# Patient Record
Sex: FEMALE | Race: WHITE
[De-identification: names, ages, dates, MRNs, and addresses within clinical notes are randomized per-mention and may not be internally consistent; named-entity substitution may affect disease eponyms.]

---

## 2018-02-09 ENCOUNTER — HOSPITAL ENCOUNTER (INPATIENT)
Dept: HOSPITAL 80 - FED | Age: 23
LOS: 4 days | Discharge: HOME | DRG: 885 | End: 2018-02-13
Attending: PSYCHIATRY & NEUROLOGY | Admitting: PSYCHIATRY & NEUROLOGY
Payer: COMMERCIAL

## 2018-02-09 DIAGNOSIS — F33.2: Primary | ICD-10-CM

## 2018-02-09 DIAGNOSIS — Z72.0: ICD-10-CM

## 2018-02-09 DIAGNOSIS — G43.909: ICD-10-CM

## 2018-02-09 DIAGNOSIS — F60.3: ICD-10-CM

## 2018-02-09 LAB — PLATELET # BLD: 282 10^3/UL (ref 150–400)

## 2018-02-09 PROCEDURE — G0480 DRUG TEST DEF 1-7 CLASSES: HCPCS

## 2018-02-09 RX ADMIN — PRAZOSIN HYDROCHLORIDE SCH MG: 1 CAPSULE ORAL at 20:34

## 2018-02-09 NOTE — EDPHY
H & P


Stated Complaint: SI





- Personal History


LMP (Females 10-55): 15-21 Days Ago


Current Tetanus Diphtheria and Acellular Pertussis (TDAP): Yes





- Medical/Surgical History


Hx Asthma: No


Hx Chronic Respiratory Disease: No


Hx Diabetes: No


Hx Cardiac Disease: No


Hx Renal Disease: No


Hx Cirrhosis: No


Hx Alcoholism: No


Hx HIV/AIDS: No


Hx Splenectomy or Spleen Trauma: No


Other PMH: BIPOLAR, UMBILICAL HERNIA, TONSIL





- Social History


Smoking Status: Current some day smoker


Time Seen by Provider: 02/09/18 02:10


HPI/ROS: 





Chief Complaint:  Suicidal ideation





HPI:  22-year-old female with a history of bipolar disorder and depression is 

presenting with worsening prepped depression last 3 weeks and suicidal ideation 

for the last 3 days.  She thinks he has increasing stressors from being back at 

school.  She has a plan to hang herself.  Has a history of suicidal ideation in 

the past but has never made a suicide attempt but she was hospitalized and put 

on medical leave for depression from a year ago.  She is compliant with her 

medications.  She denies any alcohol or other drug use this morning.  No other 

ingestions.





ROS:  10 point Review of Systems is negative except as noted in the HPI.





PMH:  Bipolar disorder





Social History: No smoking, no alcohol,  no recreational drug use





Family History: non-contributory





Physical Exam:


Gen: Awake, Alert, No Distress


HEENT:  


     Nose: no rhinorrhea


     Eyes: PERRLA, EOMI


     Mouth: Moist mucosa 


Neck: Supple, no JVD


Chest: nontender, lungs clear to auscultation


Heart: S1, S2 normal, no murmur


Abd: Soft, non-tender, no guarding


Back: no CVA tenderness, no midline tenderness 


Ext: no edema, non-tender


Skin: no rash


Neuro: CN II-XII intact, Sensation grossly intact, Strength 5/5 in bilateral 

upper and lower extremities


 (Davian Palencia)


Constitutional: 


 Initial Vital Signs











Temperature (C)  36.4 C   02/09/18 01:57


 


Heart Rate  77   02/09/18 01:57


 


Respiratory Rate  16   02/09/18 01:57


 


Blood Pressure  128/65 H  02/09/18 01:57


 


O2 Sat (%)  95   02/09/18 01:57








 











O2 Delivery Mode               Room Air














Allergies/Adverse Reactions: 


 





lamotrigine [From Lamictal] Allergy (Verified 02/09/18 08:24)


 Rash








Home Medications: 














 Medication  Instructions  Recorded


 


ARIPiprazole [Abilify 10 mg (*)] 10 mg PO DAILY 02/09/18


 


FLUoxetine [Prozac 20 MG (*)] 20 mg PO DAILY 02/09/18


 


Prazosin HCl 2 mg PO BID 02/09/18














Medical Decision Making


ED Course/Re-evaluation: 





0720: Patient has been accepted to [facility] by Dr. Powers. 





This patient was turned over to me at shift change.  I filled out the 

appropriate transfer paperwork. (Levon Myers)





0700  patient signed out to Dr. Myers pending mental health evaluation (Davian Palencia)





- Data Points


Laboratory Results: 


 Laboratory Results





 02/09/18 02:39 





 02/09/18 02:39 








Medications Given: 


 





Aripiprazole (Abilify)  10 mg PO DAILY [facility]


   Stop: 08/08/18 13:44


   Last Admin: 02/09/18 14:28 Dose:  10 mg


Hydroxyzine HCl (Hydroxyzine Hcl)  25 mg PO Q6HRS PRN


   PRN Reason: Anxiety or insomnia


   Stop: 08/08/18 10:27


   Last Admin: 02/09/18 14:28 Dose:  25 mg


Prazosin HCl (Minipress)  1 mg PO BID JAILYN


   Stop: 08/08/18 20:59


   Last Admin: 02/09/18 20:34 Dose:  1 mg





Discontinued Medications





Acetaminophen (Tylenol)  650 mg PO EDNOW ONE


   Stop: 02/09/18 07:10


   Last Admin: 02/09/18 08:22 Dose:  650 mg








Departure





- Departure


Disposition: [facility] IP


Clinical Impression: 


 Suicidal ideation





Depression


Qualifiers:


 Depression Type: other depression Qualified Code(s): F32.89 - Other specified 

depressive episodes





Condition: Fair
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
## 2018-02-09 NOTE — BCON
[f 
rep st]



                                                  BEHAVIORAL HEALTH CONSULTATION





INTERNAL MEDICINE CONSULTATION



DATE OF CONSULTATION:  02/09/2018



REFERRING PHYSICIAN:  Harvey Powers MD





REASON FOR CONSULTATION:  Medical clearance for inpatient behavioral health 
stay.



HISTORY OF PRESENT ILLNESS:  This patient was brought in to the emergency 
department with her boyfriend after experiencing suicidal ideation.  She has a 
history of bipolar disorder and was on her medication.  She was evaluated by 
the mental health team and admitted for further psychiatric care. 



She currently is without any acute complaints.



PAST MEDICAL HISTORY:  

1.  Bipolar disorder.

2.  Migraine headaches.



PAST SURGICAL HISTORY:  She has not had any surgeries.



MEDICATIONS:  

1.  Aripiprazole 10 mg daily.

2.  Fluoxetine 20 mg daily.

3.  Prazosin 2 mg b.i.d.



ALLERGIES:  Lamotrigine.



SOCIAL HISTORY:  She lives with her boyfriend.  She is a nonsmoker and uses 
occasional alcohol.  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



FAMILY HISTORY:  Noncontributory.



REVIEW OF SYSTEMS:  She denies headache at present.  She reports intermittent 
leg pain and ankle pain, which she notices at night.  Sometimes the pain occurs 
together with swelling, but not always.  It can interfere with attaining sleep, 
but does not awaken her from sleep.  She denies any joint stiffness.  She 
denies fever, chills, weight change, cough, dyspnea, nausea, vomiting, 
constipation, diarrhea, or dysuria.  Otherwise, a 10-point review of systems is 
negative.



PHYSICAL EXAM:  VITAL SIGNS:  Blood pressure is 115/72, heart rate 92, 
respiratory rate 20, oxygen saturation 94% on room air, and temperature is 36.9 
degrees centigrade.  Her weight is 59 kg for a body mass index of 25.  GENERAL:
  This is an overweight appearing woman who appears her chronologic age.  She 
is cooperative and in no acute distress.  HEENT:  Extraocular movements are 
intact.  Pupils are dilated, round, and reactive.  Mucous membranes are moist.  
Dentition is in good condition.  NECK:  Supple.  HEART:  Regular rate and 
rhythm with no murmurs, rubs, or gallops.  LUNGS:  Clear to auscultation 
bilaterally.  ABDOMEN:  Benign.  EXTREMITIES:  There is no cyanosis, clubbing, 
or edema.  She is nontender in the lower legs.  She has normal range of motion 
at the ankles.  There is no joint effusion.  NEUROLOGIC:  She is alert and 
oriented x3.  Cranial nerves 2-12 are grossly intact.  There is no focal 
weakness.  Sensation is intact to light touch and gait is within normal limits.



LABORATORY DATA:  Labs from emergency department reveal a CBC that was overall 
within normal limits.  She had a slight elevation of absolute monocytes of no 
clinical significance.  Serum chemistry revealed a slightly low carbon dioxide 
at 21.  BUN was low at 5.  Otherwise, renal function and electrolytes were 
within normal limits.  Beta hCG was negative for pregnancy.  Toxicology screens 
in the urine and serum were negative for ethyl alcohol and substances of abuse.



ASSESSMENT AND RECOMMENDATIONS:  

1.  Bipolar disorder with suicidal ideation, pending further evaluation and 
management per Psychiatry and the mental health team.

2.  Migraines with last headache approximately 2 months ago.  They do not 
appear to be frequent enough to merit prophylactic medication.  She has 
acetaminophen ordered, which should be adequate.

3.  Intermittent leg pain of unclear etiology.  This does not seem to be 
consistent with any rheumatologic disease.  She was advised to follow up with 
primary care after discharge.

4.  I see no medical contraindications to this patient's continued stay on the 
inpatient behavioral health unit or to any psychiatric medications or 
procedures. 



Thank you very much for including me in the care of this patient and please do 
not hesitate to contact me or the hospitalist service should there be need for 
further medical evaluation.





Job #:  [id number]

MTDD

## 2018-02-10 VITALS — OXYGEN SATURATION: 96 % | RESPIRATION RATE: 14 BRPM

## 2018-02-10 RX ADMIN — PRAZOSIN HYDROCHLORIDE SCH MG: 1 CAPSULE ORAL at 20:18

## 2018-02-10 RX ADMIN — PRAZOSIN HYDROCHLORIDE SCH MG: 1 CAPSULE ORAL at 08:55

## 2018-02-11 VITALS — TEMPERATURE: 98 F

## 2018-02-11 RX ADMIN — PRAZOSIN HYDROCHLORIDE SCH MG: 1 CAPSULE ORAL at 10:00

## 2018-02-11 RX ADMIN — PRAZOSIN HYDROCHLORIDE SCH MG: 1 CAPSULE ORAL at 20:35

## 2018-02-11 NOTE — SOAPPROG
SOAP Progress Note


Assessment/Plan: 


Assessment:





21 yo female, part-time [employer] student, lives with parents in [place]. She has h/

o bipolar do, borderline personality traits. She was admitted for threatening 

to hang herself. She tied rope around her neck, but took it off and told her BF 

what she'd done. 




















Plan:





02/10/18 15:36


1. [facility] - Dr. Eckert made medication adjustments yesterday. Patient says 

hydroxyzine is "helping" her anxiety. She denies any SI/HI. Feels "less 

depressed." 


2. Recommend DBT or similar individual and group therapy to improve coping 

skills and help patient deal more effectively with stressors. Patient admits 

she has labile mood. This is more likely d/t poor affect regulation consistent 

with personality disorder rather than true bipolar laurita. 





02/11/18 15:03


1. CCM - patient doing "a lot better" today, denies any depressed mood, no 

crying, no sadness, denies any SI/HI


2. Likely to d/c tomorrow when hold expires. 


3. Needs f/u with therapist, referral to IOP recommended if patient willing to 

go and f/u with prescriber. 


Subjective: 


Met with patient, reviewed chart and d/w staff. Patient present with bright, 

cheerful affect, smiling and laughing with peers in group. She tells MD that 

she has been feeling "much better" since yesterday afternoon. No further 

episodes of mood lability, sadness, crying or depression. She denies feeling 

helpless, hopeless and denies any SI/HI. 





Objective: 





 Vital Signs











Temp Pulse Resp BP Pulse Ox


 


 36.6 C   85   14   103/56 L  96 


 


 02/11/18 06:00  02/11/18 06:00  02/11/18 06:00  02/11/18 06:00  02/11/18 06:00








MSE: Affect: Euthymic  Mood: "Good"  TP: Linear  TC: Denies any SI/HI, no 

evidence of psychosis  Insight/Judgment: Fair





- Time Spent With Patient


Time Spent With Patient: 


20"








- Pending Discharge


Pending Discharge Within 24 Hours: No


Pending Discharge Within 48 Hours: No





ICD10 Worksheet


Patient Problems: 


 Problems











Problem Status Onset


 


Depression Acute  


 


Suicidal ideation Acute

## 2018-02-12 RX ADMIN — PRAZOSIN HYDROCHLORIDE SCH MG: 1 CAPSULE ORAL at 08:50

## 2018-02-12 NOTE — SOAPPROG
SOAP Progress Note


Assessment/Plan: 


Assessment:





23 yo female, part-time [employer] student, lives with parents in [place]. She has h/

o bipolar do, borderline personality traits. She was admitted for threatening 

to hang herself. She tied rope around her neck, but took it off and told her BF 

what she'd done. 




















Plan:





02/10/18 15:36


1. [facility] - Dr. Eckert made medication adjustments yesterday. Patient says 

hydroxyzine is "helping" her anxiety. She denies any SI/HI. Feels "less 

depressed." 


2. Recommend DBT or similar individual and group therapy to improve coping 

skills and help patient deal more effectively with stressors. Patient admits 

she has labile mood. This is more likely d/t poor affect regulation consistent 

with personality disorder rather than true bipolar laurita. 





02/11/18 15:03


1. CCM - patient doing "a lot better" today, denies any depressed mood, no 

crying, no sadness, denies any SI/HI


2. Likely to d/c tomorrow when hold expires. 


3. Needs f/u with therapist, referral to IOP recommended if patient willing to 

go and f/u with prescriber. 





02/12/18 15:08


1. Patient says meds are "helping a lot" with her anxiety and mood. She denies 

feeling depressed and denies any SI/HI. 


2. Will change Prazosin to HS dosing only since patient is taking for 

nightmares. She had low DBP (< 60) the last 2 days. 


3. Patient has f/u with OP therapist, Cecilia Campbell, on 2/13/18 at 1700.


4. Will d/c tomorrow 


Subjective: 


Met with patient, reviewed chart and d/w staff. Patient present bright affect, 

cheerful, smiling. She says she is "pleased I got my hope back." She states 

that she feels "more stable" and "calm." She says the gabapentin and 

hydroxyzine make her feel "a lot less restless." She rates her depression as 1/

10 and denies any thoughts, plan or intent to hurt herself. 





Objective: 





 Vital Signs











Temp Pulse Resp BP Pulse Ox


 


 36.6 C   85   14   103/56 L  96 


 


 02/11/18 06:00  02/11/18 06:00  02/11/18 06:00  02/11/18 06:00  02/11/18 06:00








MSE: Affect: Euthymic  Mood: "A lot better" TP: Linear  TC: Denies any SI/HI  

Insight/Judgment: Fair





- Time Spent With Patient


Time Spent With Patient: 


20"








- Pending Discharge


Pending Discharge Within 24 Hours: No


Pending Discharge Within 48 Hours: No





ICD10 Worksheet


Patient Problems: 


 Problems











Problem Status Onset


 


Depression Acute  


 


Suicidal ideation Acute

## 2018-02-13 VITALS — HEART RATE: 82 BPM | SYSTOLIC BLOOD PRESSURE: 98 MMHG | DIASTOLIC BLOOD PRESSURE: 55 MMHG

## 2018-02-13 NOTE — BDS
[f rep st]



                                                  BEHAVIORAL HEALTH DISCHARGE SUMMARY





REASONS FOR ADMISSION:  The patient is 22-year-old female with a history of bipolar disorder and depr
ession, who was presenting with worsening depression x 3 weeks and suicidal ideation for the last 3 d
ays.  She thinks she has increasing stressors due to being back in school.  She had a plan to hang he
rself, made a noose but then set it aside and called her boyfriend instead, told him what she was chelsie
nning to do.  She has a history of SI but has never made a suicide attempt in the past.



ADMITTING DIAGNOSES:  

1.  Bipolar disorder, most recent episode mixed.  

2.  Borderline personality traits.  

3.  Psychosocial stressors include academic stress, lack of social support, conflict with boyfriend.



PHYSICAL EXAMINATION:  Please see Dr. Nikolas Kan's H and P for details.



ADMISSION LABS:  White cell count was 8.3, hemoglobin 14.3, hematocrit 41.5, platelet count 282.  Sod
ium was 139, potassium was 3.8, chloride was 105, hemoglobin A1c was 5.2, AST was 26, ALT was 33.  Tr
iglycerides 34, cholesterol 148, LDL 74, HDL 68.  TSH 4.620.  Beta HCG was negative.  Her urine drug 
screen was negative for all substances.



HOSPITAL COURSE:  This MD saw the patient over the weekend after she was admitted, noted that Dr. Libby almonte had made some medication adjustments including adding gabapentin at h.s. for anxiety as well as 
p.r.n. hydroxyzine.  The patient stated that these medications were "very helpful" and that she was r
eporting much less anxiety and she was sleeping much better.  She also felt much less depressed.  She
 was denying any thoughts, plans or intents to hurt herself.  She said that she had briefly felt scar
ed and anxious when she was first admitted to the hospital, but said that those feelings had gone archie
y very quickly.  



When MD met with the patient, her presentation was very bright and cheerful, she was laughing and smi
ling, very polite and pleasant.  She was interacting appropriately with her peers, attending groups a
nd participating in milieu activities.  She said that, "when I am distracted and have something to do
, I feel much better."  She says normally, her moods change "very frequently" but during her hospital
 course, patient said that she felt much more "stable" and said that her moods were much more "consis
tent".  She says she is usually "very sensitive" to other people's reactions to her, and she says patel
t when other people do not treat her well or she feels bad about something that she has done, that irma arredondo gets very depressed and sad.  She admitted that she did have a whole lot of coping techniques or sk
ills to use when she was dealing with stress or interpersonal conflict, and MD suggested that she maik
ht want to participate in a DBT IOP or similar type of group therapy to work on developing interperso
nal skills.  The patient thought that was a good idea. 



On day of discharge, the patient says she was "very happy" to be going home.  Her boyfriend came to p
ick her up.  She said that things are "much better" for her.  She denied feeling depressed.  She ashtyn
ed any anxiety.  She said that she has been sleeping "a whole lot better" since she has been here.  N
o episodes of panic.  She denied feeling hopeless, helpless.  Denies anhedonia.  She was sleeping wel
l and eating well.  During her hospital stay, she denied any thoughts, plans or intents to hurt herse
lf or hurt anyone else.  She is looking forward to participating in group therapy.  She is going to g
o back to seeing her outpatient therapist, as well as her outpatient prescriber.  She felt like the m
edications were "helping", and that she would like to stay on them.



DISCHARGE MEDICATIONS:  The patient was given a prescription for Abilify 10 mg p.o. daily, 30 tabs, n
o refills.  Neurontin 200 mg p.o. at bedtime p.r.n., 30 caps, no refills.  Hydroxyzine 25 mg daily p.
r.n. 30 tabs, no refills.  Prazosin 1 mg p.o. at bedtime, 30 tabs, no refills.  The patient was put o
n prazosin for nightmares and she said it was very helpful for her.



DISCHARGE DIAGNOSES:  

1.  Major depressive disorder, recurrent, severe, without psychotic features.

2.  Borderline personality disorder.

3.  Psychosocial stressors include lack of social support, conflict with boyfriend and academic stres
sors.  Lack of personal coping skills.



CONDITION AT DISCHARGE:  Stable.  Patient reports that her mood is "really good."  Patient says that 
she is feeling "happy" today.  Denies any thoughts, plans or intents to hurt herself or anyone else. 
 She denies feeling depressed.



ATTITUDE:  Patient is "very excited" about going back home, getting back to school and pursuing outpa
tient treatment.



FOLLOWUP APPOINTMENTS:  The patient is scheduled to see Cecilia Rivera on 2/13/2018 at 5 p.m., 
and then she has a followup with Theron Ruiz MD, her prescriber.  She has an intake referral for the 
outpatient clinic at [facility] for possible IOP admission.





Job #:  [id number]

## 2021-09-15 ENCOUNTER — APPOINTMENT (OUTPATIENT)
Age: 26
Setting detail: DERMATOLOGY
End: 2021-09-15

## 2021-09-15 DIAGNOSIS — L81.4 OTHER MELANIN HYPERPIGMENTATION: ICD-10-CM

## 2021-09-15 DIAGNOSIS — L81.9 DISORDER OF PIGMENTATION, UNSPECIFIED: ICD-10-CM

## 2021-09-15 DIAGNOSIS — D22 MELANOCYTIC NEVI: ICD-10-CM

## 2021-09-15 DIAGNOSIS — L21.8 OTHER SEBORRHEIC DERMATITIS: ICD-10-CM

## 2021-09-15 DIAGNOSIS — L81.3 CAFÉ AU LAIT SPOTS: ICD-10-CM

## 2021-09-15 DIAGNOSIS — Z12.83 ENCOUNTER FOR SCREENING FOR MALIGNANT NEOPLASM OF SKIN: ICD-10-CM

## 2021-09-15 PROBLEM — D22.5 MELANOCYTIC NEVI OF TRUNK: Status: ACTIVE | Noted: 2021-09-15

## 2021-09-15 PROBLEM — D22.39 MELANOCYTIC NEVI OF OTHER PARTS OF FACE: Status: ACTIVE | Noted: 2021-09-15

## 2021-09-15 PROBLEM — D22.61 MELANOCYTIC NEVI OF RIGHT UPPER LIMB, INCLUDING SHOULDER: Status: ACTIVE | Noted: 2021-09-15

## 2021-09-15 PROCEDURE — OTHER PRESCRIPTION MEDICATION MANAGEMENT: OTHER

## 2021-09-15 PROCEDURE — OTHER COUNSELING: OTHER

## 2021-09-15 PROCEDURE — 99204 OFFICE O/P NEW MOD 45 MIN: CPT

## 2021-09-15 PROCEDURE — OTHER DIAGNOSIS COMMENT: OTHER

## 2021-09-15 PROCEDURE — OTHER PRESCRIPTION: OTHER

## 2021-09-15 RX ORDER — CLOBETASOL PROPIONATE 0.5 MG/ML
SOLUTION TOPICAL BID
Qty: 50 | Refills: 3 | Status: ERX | COMMUNITY
Start: 2021-09-15

## 2021-09-15 ASSESSMENT — LOCATION ZONE DERM
LOCATION ZONE: HAND
LOCATION ZONE: LEG
LOCATION ZONE: TRUNK
LOCATION ZONE: FACE
LOCATION ZONE: SCALP

## 2021-09-15 ASSESSMENT — LOCATION SIMPLE DESCRIPTION DERM
LOCATION SIMPLE: POSTERIOR SCALP
LOCATION SIMPLE: RIGHT CHEEK
LOCATION SIMPLE: LEFT BUTTOCK
LOCATION SIMPLE: LEFT PRETIBIAL REGION
LOCATION SIMPLE: CHEST
LOCATION SIMPLE: RIGHT HAND
LOCATION SIMPLE: UPPER BACK

## 2021-09-15 ASSESSMENT — LOCATION DETAILED DESCRIPTION DERM
LOCATION DETAILED: MIDDLE STERNUM
LOCATION DETAILED: LEFT DISTAL PRETIBIAL REGION
LOCATION DETAILED: RIGHT INFERIOR CENTRAL MALAR CHEEK
LOCATION DETAILED: LEFT BUTTOCK
LOCATION DETAILED: MID-OCCIPITAL SCALP
LOCATION DETAILED: RIGHT ULNAR PALM
LOCATION DETAILED: SUPERIOR THORACIC SPINE

## 2021-09-15 NOTE — HPI: BODY LOCATION - LEG
How Severe Is Your Condition?: moderate
Additional History: Patient states over the summer she got sunburn, but only the left leg hasn’t cleared.

## 2021-09-15 NOTE — HPI: EVALUATION OF SKIN LESION(S)
How Severe Are Your Spot(S)?: moderate
Have Your Spot(S) Been Treated In The Past?: has not been treated
Hpi Title: Evaluation of Skin Lesions
Additional History: Patient states she did have a mole biopsied, but isn’t sure if it was benign or not.

## 2021-09-15 NOTE — PROCEDURE: PRESCRIPTION MEDICATION MANAGEMENT
Detail Level: Zone
Render In Strict Bullet Format?: No
Initiate Treatment: clobetasol 0.05 % scalp solution BID

## 2021-09-15 NOTE — HPI: SKIN LESIONS
How Severe Is Your Skin Lesion?: moderate
Have Your Skin Lesions Been Treated?: not been treated
Is This A New Presentation, Or A Follow-Up?: Skin Lesions
Additional History: Patient states the freckle on her right palm is fairly new.

## 2022-06-15 LAB — CYTOLOGY CVX/VAG DOC THIN PREP: NORMAL

## 2022-08-26 ENCOUNTER — APPOINTMENT (OUTPATIENT)
Age: 27
Setting detail: DERMATOLOGY
End: 2022-08-26

## 2022-08-26 DIAGNOSIS — Z80.8 FAMILY HISTORY OF MALIGNANT NEOPLASM OF OTHER ORGANS OR SYSTEMS: ICD-10-CM

## 2022-08-26 DIAGNOSIS — D22 MELANOCYTIC NEVI: ICD-10-CM

## 2022-08-26 DIAGNOSIS — L81.4 OTHER MELANIN HYPERPIGMENTATION: ICD-10-CM

## 2022-08-26 DIAGNOSIS — Z12.83 ENCOUNTER FOR SCREENING FOR MALIGNANT NEOPLASM OF SKIN: ICD-10-CM

## 2022-08-26 DIAGNOSIS — L81.3 CAFÉ AU LAIT SPOTS: ICD-10-CM

## 2022-08-26 PROBLEM — D22.5 MELANOCYTIC NEVI OF TRUNK: Status: ACTIVE | Noted: 2022-08-26

## 2022-08-26 PROBLEM — D22.39 MELANOCYTIC NEVI OF OTHER PARTS OF FACE: Status: ACTIVE | Noted: 2022-08-26

## 2022-08-26 PROCEDURE — OTHER COUNSELING: OTHER

## 2022-08-26 PROCEDURE — 99213 OFFICE O/P EST LOW 20 MIN: CPT

## 2022-08-26 ASSESSMENT — LOCATION SIMPLE DESCRIPTION DERM
LOCATION SIMPLE: RIGHT CHEEK
LOCATION SIMPLE: UPPER BACK
LOCATION SIMPLE: LEFT PRETIBIAL REGION

## 2022-08-26 ASSESSMENT — LOCATION ZONE DERM
LOCATION ZONE: FACE
LOCATION ZONE: LEG
LOCATION ZONE: TRUNK

## 2022-08-26 ASSESSMENT — LOCATION DETAILED DESCRIPTION DERM
LOCATION DETAILED: RIGHT INFERIOR CENTRAL MALAR CHEEK
LOCATION DETAILED: LEFT DISTAL PRETIBIAL REGION
LOCATION DETAILED: SUPERIOR THORACIC SPINE

## 2022-08-26 NOTE — HPI: SKIN LESION
How Severe Is Your Skin Lesion?: moderate
Has Your Skin Lesion Been Treated?: not been treated
Is This A New Presentation, Or A Follow-Up?: Skin Lesion
Additional History: Not sure if lesion is a mole or a skin tag.

## 2023-08-22 ENCOUNTER — APPOINTMENT (OUTPATIENT)
Dept: URBAN - METROPOLITAN AREA CLINIC 314 | Age: 28
Setting detail: DERMATOLOGY
End: 2023-08-23

## 2023-08-22 DIAGNOSIS — L81.4 OTHER MELANIN HYPERPIGMENTATION: ICD-10-CM

## 2023-08-22 DIAGNOSIS — L21.8 OTHER SEBORRHEIC DERMATITIS: ICD-10-CM

## 2023-08-22 DIAGNOSIS — L81.3 CAFÉ AU LAIT SPOTS: ICD-10-CM

## 2023-08-22 DIAGNOSIS — D22 MELANOCYTIC NEVI: ICD-10-CM

## 2023-08-22 DIAGNOSIS — Z12.83 ENCOUNTER FOR SCREENING FOR MALIGNANT NEOPLASM OF SKIN: ICD-10-CM

## 2023-08-22 DIAGNOSIS — Z80.8 FAMILY HISTORY OF MALIGNANT NEOPLASM OF OTHER ORGANS OR SYSTEMS: ICD-10-CM

## 2023-08-22 PROBLEM — D22.39 MELANOCYTIC NEVI OF OTHER PARTS OF FACE: Status: ACTIVE | Noted: 2023-08-22

## 2023-08-22 PROBLEM — D22.5 MELANOCYTIC NEVI OF TRUNK: Status: ACTIVE | Noted: 2023-08-22

## 2023-08-22 PROCEDURE — 99213 OFFICE O/P EST LOW 20 MIN: CPT

## 2023-08-22 PROCEDURE — OTHER MIPS QUALITY: OTHER

## 2023-08-22 PROCEDURE — OTHER PRESCRIPTION MEDICATION MANAGEMENT: OTHER

## 2023-08-22 PROCEDURE — OTHER COUNSELING: OTHER

## 2023-08-22 PROCEDURE — OTHER PRESCRIPTION: OTHER

## 2023-08-22 RX ORDER — CICLOPIROX 10 MG/.96ML
SHAMPOO TOPICAL
Qty: 120 | Refills: 6 | Status: ERX | COMMUNITY
Start: 2023-08-22

## 2023-08-22 ASSESSMENT — LOCATION ZONE DERM
LOCATION ZONE: LEG
LOCATION ZONE: SCALP
LOCATION ZONE: FACE
LOCATION ZONE: TRUNK

## 2023-08-22 ASSESSMENT — LOCATION SIMPLE DESCRIPTION DERM
LOCATION SIMPLE: LEFT PRETIBIAL REGION
LOCATION SIMPLE: UPPER BACK
LOCATION SIMPLE: RIGHT CHEEK
LOCATION SIMPLE: SCALP

## 2023-08-22 ASSESSMENT — LOCATION DETAILED DESCRIPTION DERM
LOCATION DETAILED: RIGHT INFERIOR CENTRAL MALAR CHEEK
LOCATION DETAILED: RIGHT SUPERIOR PARIETAL SCALP
LOCATION DETAILED: LEFT DISTAL PRETIBIAL REGION
LOCATION DETAILED: SUPERIOR THORACIC SPINE

## 2023-08-22 NOTE — PROCEDURE: PRESCRIPTION MEDICATION MANAGEMENT
Render In Strict Bullet Format?: No
Detail Level: Zone
Initiate Treatment: -\\nciclopirox 1 % shampoo QWeekly

## 2023-12-11 ENCOUNTER — TELEPHONE (OUTPATIENT)
Dept: CARDIOLOGY | Age: 28
End: 2023-12-11

## 2023-12-11 ENCOUNTER — TELEPHONE (OUTPATIENT)
Dept: OTHER | Age: 28
End: 2023-12-11

## 2023-12-11 DIAGNOSIS — R00.0 TACHYCARDIA: Primary | ICD-10-CM

## 2023-12-12 ENCOUNTER — ANCILLARY PROCEDURE (OUTPATIENT)
Dept: CARDIOLOGY | Age: 28
End: 2023-12-12
Attending: INTERNAL MEDICINE

## 2023-12-12 ENCOUNTER — TELEPHONE (OUTPATIENT)
Dept: OTHER | Age: 28
End: 2023-12-12

## 2023-12-12 ENCOUNTER — OFFICE VISIT (OUTPATIENT)
Dept: CARDIOLOGY | Age: 28
End: 2023-12-12
Attending: STUDENT IN AN ORGANIZED HEALTH CARE EDUCATION/TRAINING PROGRAM

## 2023-12-12 VITALS
OXYGEN SATURATION: 100 % | WEIGHT: 151.9 LBS | SYSTOLIC BLOOD PRESSURE: 117 MMHG | HEART RATE: 90 BPM | DIASTOLIC BLOOD PRESSURE: 75 MMHG

## 2023-12-12 DIAGNOSIS — R00.2 PALPITATIONS: ICD-10-CM

## 2023-12-12 DIAGNOSIS — R00.2 PALPITATIONS: Primary | ICD-10-CM

## 2023-12-12 DIAGNOSIS — R00.0 TACHYCARDIA: Primary | ICD-10-CM

## 2023-12-12 PROCEDURE — 99203 OFFICE O/P NEW LOW 30 MIN: CPT | Performed by: INTERNAL MEDICINE

## 2023-12-12 RX ORDER — ALPRAZOLAM 0.25 MG/1
TABLET ORAL
COMMUNITY
Start: 2023-10-13

## 2023-12-12 RX ORDER — CARIPRAZINE 6 MG/1
CAPSULE, GELATIN COATED ORAL
COMMUNITY
Start: 2023-10-13

## 2023-12-12 ASSESSMENT — PATIENT HEALTH QUESTIONNAIRE - PHQ9
1. LITTLE INTEREST OR PLEASURE IN DOING THINGS: NOT AT ALL
2. FEELING DOWN, DEPRESSED OR HOPELESS: NOT AT ALL
SUM OF ALL RESPONSES TO PHQ9 QUESTIONS 1 AND 2: 0
SUM OF ALL RESPONSES TO PHQ9 QUESTIONS 1 AND 2: 0
CLINICAL INTERPRETATION OF PHQ2 SCORE: NO FURTHER SCREENING NEEDED

## 2023-12-20 ENCOUNTER — TELEPHONE (OUTPATIENT)
Dept: CARDIOLOGY | Age: 28
End: 2023-12-20

## 2023-12-28 ENCOUNTER — TELEPHONE (OUTPATIENT)
Dept: CARDIOLOGY | Age: 28
End: 2023-12-28

## 2024-01-02 ENCOUNTER — HOSPITAL ENCOUNTER (OUTPATIENT)
Dept: CARDIOLOGY | Age: 29
Discharge: HOME | End: 2024-01-02
Attending: STUDENT IN AN ORGANIZED HEALTH CARE EDUCATION/TRAINING PROGRAM

## 2024-01-02 DIAGNOSIS — R00.0 TACHYCARDIA: ICD-10-CM

## 2024-01-02 LAB
AORTIC VALVE AREA (AVA): 0.97
ASCENDING AORTA (AAD): 3
AV PEAK GRADIENT (AVPG): 8
AV PEAK VELOCITY (AVPV): 1.39
AV STENOSIS SEVERITY TEXT: NORMAL
AVI LVOT PEAK GRADIENT (LVOTMG): 0.6
E WAVE DECELARATION TIME (MDT): 8.66
INTERVENTRICULAR SEPTUM IN END DIASTOLE (IVSD): 2.48
LEFT INTERNAL DIMENSION IN SYSTOLE (LVSD): 0.6
LEFT VENTRICULAR INTERNAL DIMENSION IN DIASTOLE (LVDD): 3
LEFT VENTRICULAR POSTERIOR WALL IN END DIASTOLE (LVPW): 4.4
LV EF: NORMAL %
LVOT 2D (LVOTD): 21.1
LVOT VTI (LVOTVTI): 1.04
MV E TISSUE VEL LAT (MELV): 0.98
MV E TISSUE VEL MED (MESV): 16.8
MV E WAVE VEL/E TISSUE VEL MED(MSR): 11.2
MV PEAK A VELOCITY (MVPAV): 185
MV PEAK E VELOCITY (MVPEV): 0.56
RV END SYSTOLIC LONGITUDINAL STRAIN FREE WALL (RVGS): 1.8
TRICUSPID VALVE PEAK REGURGITATION VELOCITY (TRPV): 2.4

## 2024-01-02 PROCEDURE — 76376 3D RENDER W/INTRP POSTPROCES: CPT

## 2024-01-02 PROCEDURE — 76376 3D RENDER W/INTRP POSTPROCES: CPT | Performed by: INTERNAL MEDICINE

## 2024-01-02 PROCEDURE — 93356 MYOCRD STRAIN IMG SPCKL TRCK: CPT | Performed by: INTERNAL MEDICINE

## 2024-01-02 PROCEDURE — 93017 CV STRESS TEST TRACING ONLY: CPT

## 2024-01-02 PROCEDURE — 93016 CV STRESS TEST SUPVJ ONLY: CPT | Performed by: INTERNAL MEDICINE

## 2024-01-02 PROCEDURE — 93306 TTE W/DOPPLER COMPLETE: CPT | Performed by: INTERNAL MEDICINE

## 2024-01-02 PROCEDURE — 93018 CV STRESS TEST I&R ONLY: CPT | Performed by: INTERNAL MEDICINE

## 2024-01-03 ENCOUNTER — TELEPHONE (OUTPATIENT)
Dept: CARDIOLOGY | Age: 29
End: 2024-01-03

## 2024-02-06 ENCOUNTER — NURSE TRIAGE (OUTPATIENT)
Dept: TELEHEALTH | Age: 29
End: 2024-02-06

## 2024-02-06 ENCOUNTER — TELEPHONE (OUTPATIENT)
Dept: CARDIOLOGY | Age: 29
End: 2024-02-06

## 2024-02-06 ENCOUNTER — TELEPHONE (OUTPATIENT)
Dept: TELEHEALTH | Age: 29
End: 2024-02-06

## 2024-02-13 ENCOUNTER — TELEPHONE (OUTPATIENT)
Dept: CARDIOLOGY | Age: 29
End: 2024-02-13

## 2024-03-05 ENCOUNTER — OFFICE VISIT (OUTPATIENT)
Dept: CARDIOLOGY | Age: 29
End: 2024-03-05

## 2024-03-05 VITALS
OXYGEN SATURATION: 100 % | HEART RATE: 118 BPM | DIASTOLIC BLOOD PRESSURE: 74 MMHG | WEIGHT: 154.1 LBS | SYSTOLIC BLOOD PRESSURE: 115 MMHG

## 2024-03-05 DIAGNOSIS — R00.2 PALPITATIONS: Primary | ICD-10-CM

## 2024-03-05 PROCEDURE — 99213 OFFICE O/P EST LOW 20 MIN: CPT | Performed by: INTERNAL MEDICINE

## 2024-03-05 ASSESSMENT — PATIENT HEALTH QUESTIONNAIRE - PHQ9
SUM OF ALL RESPONSES TO PHQ9 QUESTIONS 1 AND 2: 0
1. LITTLE INTEREST OR PLEASURE IN DOING THINGS: NOT AT ALL
CLINICAL INTERPRETATION OF PHQ2 SCORE: NO FURTHER SCREENING NEEDED
2. FEELING DOWN, DEPRESSED OR HOPELESS: NOT AT ALL
SUM OF ALL RESPONSES TO PHQ9 QUESTIONS 1 AND 2: 0

## 2024-05-30 ENCOUNTER — TELEPHONE (OUTPATIENT)
Dept: OTHER | Age: 29
End: 2024-05-30

## 2024-06-28 ENCOUNTER — CLINICAL ABSTRACT (OUTPATIENT)
Dept: CARE COORDINATION | Age: 29
End: 2024-06-28

## 2024-07-03 ENCOUNTER — APPOINTMENT (OUTPATIENT)
Dept: BEHAVIORAL HEALTH | Age: 29
End: 2024-07-03
Attending: PSYCHIATRY & NEUROLOGY

## 2025-04-03 ENCOUNTER — OFFICE VISIT (OUTPATIENT)
Dept: INTERNAL MEDICINE | Age: 30
End: 2025-04-03

## 2025-04-03 VITALS
SYSTOLIC BLOOD PRESSURE: 118 MMHG | TEMPERATURE: 97.9 F | WEIGHT: 155 LBS | OXYGEN SATURATION: 99 % | DIASTOLIC BLOOD PRESSURE: 72 MMHG | HEART RATE: 112 BPM | BODY MASS INDEX: 29.27 KG/M2 | RESPIRATION RATE: 18 BRPM | HEIGHT: 61 IN

## 2025-04-03 DIAGNOSIS — R79.0 ABNORMAL IRON SATURATION: ICD-10-CM

## 2025-04-03 DIAGNOSIS — R79.9 ABNORMAL BLOOD FINDING: ICD-10-CM

## 2025-04-03 DIAGNOSIS — E55.9 VITAMIN D DEFICIENCY: ICD-10-CM

## 2025-04-03 DIAGNOSIS — M54.2 NECK DISCOMFORT: Primary | ICD-10-CM

## 2025-04-03 DIAGNOSIS — R53.82 CHRONIC FATIGUE: ICD-10-CM

## 2025-04-03 DIAGNOSIS — R00.0 TACHYCARDIA: ICD-10-CM

## 2025-04-03 PROBLEM — Z90.89 HISTORY OF TONSILLECTOMY: Status: ACTIVE | Noted: 2021-02-18

## 2025-04-03 PROBLEM — F31.77 BIPOLAR DISORDER, IN PARTIAL REMISSION, MOST RECENT EPISODE MIXED  (CMD): Status: ACTIVE | Noted: 2022-09-12

## 2025-04-03 PROBLEM — G43.909 MIGRAINES: Status: ACTIVE | Noted: 2019-12-18

## 2025-04-03 PROBLEM — F42.9 OBSESSIVE-COMPULSIVE DISORDER: Status: ACTIVE | Noted: 2023-07-05

## 2025-04-03 PROCEDURE — 99204 OFFICE O/P NEW MOD 45 MIN: CPT | Performed by: INTERNAL MEDICINE

## 2025-04-03 RX ORDER — TIRZEPATIDE 2.5 MG/.5ML
2.5 INJECTION, SOLUTION SUBCUTANEOUS
COMMUNITY
End: 2025-04-03 | Stop reason: ALTCHOICE

## 2025-04-03 RX ORDER — BUPROPION HYDROCHLORIDE 150 MG/1
150 TABLET ORAL EVERY MORNING
COMMUNITY
Start: 2025-02-21

## 2025-04-03 RX ORDER — DIVALPROEX SODIUM 250 MG/1
250 TABLET, FILM COATED, EXTENDED RELEASE ORAL DAILY
COMMUNITY
Start: 2025-02-24

## 2025-04-03 RX ORDER — CLOMIPRAMINE HYDROCHLORIDE 75 MG/1
CAPSULE ORAL
COMMUNITY

## 2025-04-03 ASSESSMENT — PATIENT HEALTH QUESTIONNAIRE - PHQ9
CLINICAL INTERPRETATION OF PHQ2 SCORE: NO FURTHER SCREENING NEEDED
SUM OF ALL RESPONSES TO PHQ9 QUESTIONS 1 AND 2: 2
SUM OF ALL RESPONSES TO PHQ9 QUESTIONS 1 AND 2: 2
1. LITTLE INTEREST OR PLEASURE IN DOING THINGS: SEVERAL DAYS
2. FEELING DOWN, DEPRESSED OR HOPELESS: SEVERAL DAYS

## 2025-04-03 ASSESSMENT — PAIN SCALES - GENERAL: PAINLEVEL: 0

## 2025-04-09 ENCOUNTER — E-ADVICE (OUTPATIENT)
Dept: INTERNAL MEDICINE | Age: 30
End: 2025-04-09

## 2025-04-09 PROBLEM — R79.0 ABNORMAL IRON SATURATION: Status: ACTIVE | Noted: 2025-04-09

## 2025-04-09 PROBLEM — R00.0 TACHYCARDIA: Status: ACTIVE | Noted: 2025-04-09

## 2025-04-09 PROBLEM — R53.82 CHRONIC FATIGUE: Status: ACTIVE | Noted: 2025-04-09

## 2025-07-21 ENCOUNTER — APPOINTMENT (OUTPATIENT)
Dept: URBAN - METROPOLITAN AREA CLINIC 314 | Age: 30
Setting detail: DERMATOLOGY
End: 2025-07-21

## 2025-07-21 DIAGNOSIS — L70.0 ACNE VULGARIS: ICD-10-CM

## 2025-07-21 PROCEDURE — OTHER ADDITIONAL NOTES: OTHER

## 2025-07-21 PROCEDURE — 99214 OFFICE O/P EST MOD 30 MIN: CPT

## 2025-07-21 PROCEDURE — OTHER PRESCRIPTION MEDICATION MANAGEMENT: OTHER

## 2025-07-21 PROCEDURE — OTHER PHOTO-DOCUMENTATION: OTHER

## 2025-07-21 PROCEDURE — OTHER COUNSELING: OTHER

## 2025-07-21 PROCEDURE — OTHER MIPS QUALITY: OTHER

## 2025-07-21 PROCEDURE — OTHER PRESCRIPTION: OTHER

## 2025-07-21 RX ORDER — TRETIONIN 0.25 MG/G
CREAM TOPICAL QHS
Qty: 45 | Refills: 3 | Status: ERX | COMMUNITY
Start: 2025-07-21

## 2025-07-21 ASSESSMENT — LOCATION DETAILED DESCRIPTION DERM
LOCATION DETAILED: LEFT MEDIAL MALAR CHEEK
LOCATION DETAILED: LEFT INFERIOR MEDIAL FOREHEAD
LOCATION DETAILED: RIGHT CENTRAL MALAR CHEEK

## 2025-07-21 ASSESSMENT — LOCATION ZONE DERM: LOCATION ZONE: FACE

## 2025-07-21 ASSESSMENT — LOCATION SIMPLE DESCRIPTION DERM
LOCATION SIMPLE: RIGHT CHEEK
LOCATION SIMPLE: LEFT CHEEK
LOCATION SIMPLE: LEFT FOREHEAD